# Patient Record
Sex: MALE | Race: WHITE | NOT HISPANIC OR LATINO | Employment: STUDENT | URBAN - METROPOLITAN AREA
[De-identification: names, ages, dates, MRNs, and addresses within clinical notes are randomized per-mention and may not be internally consistent; named-entity substitution may affect disease eponyms.]

---
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
## 2025-01-20 ENCOUNTER — OFFICE VISIT (OUTPATIENT)
Dept: PEDIATRICS | Facility: CLINIC | Age: 20
End: 2025-01-20
Payer: COMMERCIAL

## 2025-01-20 VITALS
HEIGHT: 69 IN | DIASTOLIC BLOOD PRESSURE: 74 MMHG | WEIGHT: 147.2 LBS | HEART RATE: 82 BPM | BODY MASS INDEX: 21.8 KG/M2 | SYSTOLIC BLOOD PRESSURE: 117 MMHG | TEMPERATURE: 97.9 F

## 2025-01-20 DIAGNOSIS — D22.5 ATYPICAL NEVUS OF THORACIC REGION: Primary | ICD-10-CM

## 2025-01-20 DIAGNOSIS — B36.0 TINEA VERSICOLOR: ICD-10-CM

## 2025-01-20 PROCEDURE — 99213 OFFICE O/P EST LOW 20 MIN: CPT | Performed by: PEDIATRICS

## 2025-01-20 PROCEDURE — 3008F BODY MASS INDEX DOCD: CPT | Performed by: PEDIATRICS

## 2025-01-20 NOTE — PROGRESS NOTES
"Subjective   Patient ID: Salo Horton is a 19 y.o. male who presents for Mass (Patient is 19 yrs old here for spots on his chest/sides x couple of yrs. Pt says that the one on his chest has gotten bigger. Hurts to the touch and at times looks like there may be a pimple like spot on them that comes and goes with some discharge.).    History was provided by the patient.    Spots on chest - have started small, on his chest, side of trunks, hyave progressed since then, more painful when manipulating them. Sometimes look almost like pimples - red bump. One time he popped one and little clear drainage came out.  They get irritated if he accidenally scratches them or bothers them    No treatments tried.     Has some lighter areas on the skin as well for awhile,a nd some old acne scars.     Has been meaning to come in but just hasn't done it due to school at  - [employer]. Sophomore now.     ROS negative for General, ENT, Cardiovascular, GI and Neuro except as noted in HPI above    Objective     /74   Pulse 82   Temp 36.6 °C (97.9 °F) (Oral)   Ht 1.753 m (5' 9\")   Wt 66.8 kg (147 lb 3.2 oz)   BMI 21.74 kg/m²     General: Well-developed, well-nourished, alert and oriented, no acute distress  Cardiac:  Normal S1/S2, regular rhythm. Capillary refill less than 2 seconds. No clinically signficant murmurs   Pulmonary: Clear to auscultation bilaterally, no work of breathing.  Skin: mutiple old acne scars.   5 nevis that he points out although more preseent. 2 on each side in the midaxillary line area look round, regular, single brown color.  Over the sternum is more raised, irregular, bicolored and somewhat blotch.    Multiple hypopigmented areas c/w tinea versicolor as well.   Orthopedic: using all extremities well     Labs from last 96 hours:  No results found for this or any previous visit (from the past 96 hours).    Imaging from last 24 hours:  No results found.    Assessment/Plan     Diagnoses and all orders " for this visit:  Atypical nevus of thoracic region  -     Referral to Dermatology      Patient Instructions   Atypical nevus - I would get this checked out at dermatology - the one over the chest bone is the more concerning one to get them to look at.   Benign nevi on the sides - monitor for now  Tinea versicolor - the white spots on chest and back - you can rub dandruff shampoo on these once or twice a day in the shower. Also apply lotrimin over the counter twice a day.     Call back if you are having trouble getting in with dermatology.

## 2025-01-20 NOTE — PATIENT INSTRUCTIONS
Atypical nevus - I would get this checked out at dermatology - the one over the chest bone is the more concerning one to get them to look at.   Benign nevi on the sides - monitor for now  Tinea versicolor - the white spots on chest and back - you can rub dandruff shampoo on these once or twice a day in the shower. Also apply lotrimin over the counter twice a day.     Call back if you are having trouble getting in with dermatology.